# Patient Record
Sex: FEMALE | Race: ASIAN | NOT HISPANIC OR LATINO | URBAN - METROPOLITAN AREA
[De-identification: names, ages, dates, MRNs, and addresses within clinical notes are randomized per-mention and may not be internally consistent; named-entity substitution may affect disease eponyms.]

---

## 2020-10-20 ENCOUNTER — OFFICE VISIT (OUTPATIENT)
Dept: URBAN - METROPOLITAN AREA CLINIC 37 | Facility: CLINIC | Age: 60
End: 2020-10-20

## 2020-10-20 VITALS — WEIGHT: 130 LBS | BODY MASS INDEX: 23.04 KG/M2 | HEIGHT: 63 IN

## 2020-10-20 PROBLEM — 267434003: Status: ACTIVE | Noted: 2020-10-20

## 2020-10-20 PROBLEM — 275978004 SCREENING FOR MALIGNANT NEOPLASM OF COLON: Status: ACTIVE | Noted: 2020-10-20

## 2020-10-20 RX ORDER — PETROLATUM,WHITE/LANOLIN
AS DIRECTED OINTMENT (GRAM) TOPICAL
Status: ACTIVE | COMMUNITY

## 2020-10-20 RX ORDER — PRAVASTATIN SODIUM 20 MG/1
1 TABLET TABLET ORAL
Status: ACTIVE | COMMUNITY

## 2020-10-20 RX ORDER — MULTIVITAMIN
1 TABLET TABLET ORAL ONCE A DAY
Qty: 30 | Status: ACTIVE | COMMUNITY

## 2020-10-20 RX ORDER — MELATONIN 1 MG/ML
1 CAPSULE LIQUID (ML) ORAL ONCE A DAY
Qty: 30 | Status: ACTIVE | COMMUNITY

## 2020-10-20 RX ORDER — CYANOCOBALAMIN (VITAMIN B-12) 500 MCG
1 TABLET LOZENGE ORAL ONCE A DAY
Qty: 30 | Status: ACTIVE | COMMUNITY

## 2020-10-20 RX ORDER — SODIUM SULFATE, POTASSIUM SULFATE, MAGNESIUM SULFATE 17.5; 3.13; 1.6 G/ML; G/ML; G/ML
AS DIRECTED SOLUTION, CONCENTRATE ORAL ONCE
Qty: 1 KIT | Refills: 0 | OUTPATIENT
Start: 2020-10-20

## 2020-10-20 NOTE — HPI-MIGRATED HPI
Colorectal cancer screening : Current bowel movement patterns -> One soft BM daily;   Colorectal cancer screening : Family history of GI malignancy: -> Denies;   Colorectal cancer screening : Patient is here for initial consultation for -> first time screening colonoscopy;   Colorectal cancer screening : Patients denies -> rectal bleeding, change in bowel habits, constipation, diarrhea, or abdominal pain;     Colorectal cancer screening : Denies dysphagia or odynophagia Currently taking anticoagulants/NSAIDs: ?;

## 2020-10-22 ENCOUNTER — LAB OUTSIDE AN ENCOUNTER (OUTPATIENT)
Dept: URBAN - METROPOLITAN AREA CLINIC 37 | Facility: CLINIC | Age: 60
End: 2020-10-22

## 2020-11-23 ENCOUNTER — OFFICE VISIT (OUTPATIENT)
Dept: URBAN - METROPOLITAN AREA MEDICAL CENTER 10 | Facility: MEDICAL CENTER | Age: 60
End: 2020-11-23

## 2020-12-11 ENCOUNTER — DASHBOARD ENCOUNTERS (OUTPATIENT)
Age: 60
End: 2020-12-11

## 2020-12-15 ENCOUNTER — OFFICE VISIT (OUTPATIENT)
Dept: URBAN - METROPOLITAN AREA CLINIC 35 | Facility: CLINIC | Age: 60
End: 2020-12-15

## 2020-12-15 VITALS — WEIGHT: 130 LBS | HEIGHT: 63 IN | BODY MASS INDEX: 23.04 KG/M2

## 2020-12-15 RX ORDER — MULTIVITAMIN
1 TABLET TABLET ORAL ONCE A DAY
Qty: 30 | Status: ACTIVE | COMMUNITY

## 2020-12-15 RX ORDER — PETROLATUM,WHITE/LANOLIN
AS DIRECTED OINTMENT (GRAM) TOPICAL
Status: ACTIVE | COMMUNITY

## 2020-12-15 RX ORDER — PRAVASTATIN SODIUM 20 MG/1
1 TABLET TABLET ORAL
Status: ACTIVE | COMMUNITY

## 2020-12-15 RX ORDER — CYANOCOBALAMIN (VITAMIN B-12) 500 MCG
1 TABLET LOZENGE ORAL ONCE A DAY
Qty: 30 | Status: ACTIVE | COMMUNITY

## 2020-12-15 RX ORDER — MELATONIN 1 MG/ML
1 CAPSULE LIQUID (ML) ORAL ONCE A DAY
Qty: 30 | Status: ACTIVE | COMMUNITY

## 2020-12-15 NOTE — HPI-MIGRATED HPI
Post-op OV : Patient -> denies any new changes in his/her health status since last OV;   Post-op OV : Patient reports of current symptoms -> Current BM:  soft BM daily;   Post-op OV : After Colonoscopy on -> 11/23/2020, at which time two small polyps were detected and resected. Histology showed they were tubular adenoma polyps. Non-bleeding grade II internal and external hemorrhoids were found during retroflexion but not banded. Good bowel prep;   Post-op OV : Patient denies -> rectal bleeding, fever, nausea & vomiting since the procedure date;